# Patient Record
Sex: MALE | Race: OTHER | HISPANIC OR LATINO | URBAN - METROPOLITAN AREA
[De-identification: names, ages, dates, MRNs, and addresses within clinical notes are randomized per-mention and may not be internally consistent; named-entity substitution may affect disease eponyms.]

---

## 2024-02-15 ENCOUNTER — EMERGENCY (EMERGENCY)
Facility: HOSPITAL | Age: 39
LOS: 1 days | Discharge: HOME | End: 2024-02-15
Attending: EMERGENCY MEDICINE
Payer: SELF-PAY

## 2024-02-15 VITALS
OXYGEN SATURATION: 97 % | RESPIRATION RATE: 17 BRPM | TEMPERATURE: 98 F | DIASTOLIC BLOOD PRESSURE: 95 MMHG | HEART RATE: 107 BPM | WEIGHT: 172.4 LBS | SYSTOLIC BLOOD PRESSURE: 167 MMHG

## 2024-02-15 VITALS
TEMPERATURE: 98 F | SYSTOLIC BLOOD PRESSURE: 132 MMHG | RESPIRATION RATE: 18 BRPM | HEART RATE: 77 BPM | DIASTOLIC BLOOD PRESSURE: 74 MMHG | OXYGEN SATURATION: 97 %

## 2024-02-15 LAB
ALBUMIN SERPL ELPH-MCNC: 4.5 G/DL — SIGNIFICANT CHANGE UP (ref 3.3–5.2)
ALP SERPL-CCNC: 99 U/L — SIGNIFICANT CHANGE UP (ref 40–120)
ALT FLD-CCNC: 38 U/L — SIGNIFICANT CHANGE UP
ANION GAP SERPL CALC-SCNC: 15 MMOL/L — SIGNIFICANT CHANGE UP (ref 5–17)
AST SERPL-CCNC: 21 U/L — SIGNIFICANT CHANGE UP
BASOPHILS # BLD AUTO: 0.04 K/UL — SIGNIFICANT CHANGE UP (ref 0–0.2)
BASOPHILS NFR BLD AUTO: 0.5 % — SIGNIFICANT CHANGE UP (ref 0–2)
BILIRUB SERPL-MCNC: 0.5 MG/DL — SIGNIFICANT CHANGE UP (ref 0.4–2)
BUN SERPL-MCNC: 11.5 MG/DL — SIGNIFICANT CHANGE UP (ref 8–20)
CALCIUM SERPL-MCNC: 9.1 MG/DL — SIGNIFICANT CHANGE UP (ref 8.4–10.5)
CHLORIDE SERPL-SCNC: 100 MMOL/L — SIGNIFICANT CHANGE UP (ref 96–108)
CO2 SERPL-SCNC: 24 MMOL/L — SIGNIFICANT CHANGE UP (ref 22–29)
CREAT SERPL-MCNC: 0.76 MG/DL — SIGNIFICANT CHANGE UP (ref 0.5–1.3)
D DIMER BLD IA.RAPID-MCNC: <150 NG/ML DDU — SIGNIFICANT CHANGE UP
EGFR: 118 ML/MIN/1.73M2 — SIGNIFICANT CHANGE UP
EOSINOPHIL # BLD AUTO: 0.03 K/UL — SIGNIFICANT CHANGE UP (ref 0–0.5)
EOSINOPHIL NFR BLD AUTO: 0.4 % — SIGNIFICANT CHANGE UP (ref 0–6)
FLUAV AG NPH QL: SIGNIFICANT CHANGE UP
FLUBV AG NPH QL: SIGNIFICANT CHANGE UP
GLUCOSE SERPL-MCNC: 142 MG/DL — HIGH (ref 70–99)
HCT VFR BLD CALC: 49.5 % — SIGNIFICANT CHANGE UP (ref 39–50)
HGB BLD-MCNC: 17 G/DL — SIGNIFICANT CHANGE UP (ref 13–17)
IMM GRANULOCYTES NFR BLD AUTO: 0.1 % — SIGNIFICANT CHANGE UP (ref 0–0.9)
LYMPHOCYTES # BLD AUTO: 1.8 K/UL — SIGNIFICANT CHANGE UP (ref 1–3.3)
LYMPHOCYTES # BLD AUTO: 24.5 % — SIGNIFICANT CHANGE UP (ref 13–44)
MCHC RBC-ENTMCNC: 28.3 PG — SIGNIFICANT CHANGE UP (ref 27–34)
MCHC RBC-ENTMCNC: 34.3 GM/DL — SIGNIFICANT CHANGE UP (ref 32–36)
MCV RBC AUTO: 82.4 FL — SIGNIFICANT CHANGE UP (ref 80–100)
MONOCYTES # BLD AUTO: 0.52 K/UL — SIGNIFICANT CHANGE UP (ref 0–0.9)
MONOCYTES NFR BLD AUTO: 7.1 % — SIGNIFICANT CHANGE UP (ref 2–14)
NEUTROPHILS # BLD AUTO: 4.94 K/UL — SIGNIFICANT CHANGE UP (ref 1.8–7.4)
NEUTROPHILS NFR BLD AUTO: 67.4 % — SIGNIFICANT CHANGE UP (ref 43–77)
PLATELET # BLD AUTO: 295 K/UL — SIGNIFICANT CHANGE UP (ref 150–400)
POTASSIUM SERPL-MCNC: 3.8 MMOL/L — SIGNIFICANT CHANGE UP (ref 3.5–5.3)
POTASSIUM SERPL-SCNC: 3.8 MMOL/L — SIGNIFICANT CHANGE UP (ref 3.5–5.3)
PROT SERPL-MCNC: 7.5 G/DL — SIGNIFICANT CHANGE UP (ref 6.6–8.7)
RBC # BLD: 6.01 M/UL — HIGH (ref 4.2–5.8)
RBC # FLD: 12.7 % — SIGNIFICANT CHANGE UP (ref 10.3–14.5)
RSV RNA NPH QL NAA+NON-PROBE: SIGNIFICANT CHANGE UP
SARS-COV-2 RNA SPEC QL NAA+PROBE: SIGNIFICANT CHANGE UP
SODIUM SERPL-SCNC: 139 MMOL/L — SIGNIFICANT CHANGE UP (ref 135–145)
TROPONIN T, HIGH SENSITIVITY RESULT: <6 NG/L — SIGNIFICANT CHANGE UP (ref 0–51)
WBC # BLD: 7.34 K/UL — SIGNIFICANT CHANGE UP (ref 3.8–10.5)
WBC # FLD AUTO: 7.34 K/UL — SIGNIFICANT CHANGE UP (ref 3.8–10.5)

## 2024-02-15 PROCEDURE — 93005 ELECTROCARDIOGRAM TRACING: CPT

## 2024-02-15 PROCEDURE — 85025 COMPLETE CBC W/AUTO DIFF WBC: CPT

## 2024-02-15 PROCEDURE — 85379 FIBRIN DEGRADATION QUANT: CPT

## 2024-02-15 PROCEDURE — 93010 ELECTROCARDIOGRAM REPORT: CPT

## 2024-02-15 PROCEDURE — 87637 SARSCOV2&INF A&B&RSV AMP PRB: CPT

## 2024-02-15 PROCEDURE — 71045 X-RAY EXAM CHEST 1 VIEW: CPT

## 2024-02-15 PROCEDURE — 84484 ASSAY OF TROPONIN QUANT: CPT

## 2024-02-15 PROCEDURE — 36415 COLL VENOUS BLD VENIPUNCTURE: CPT

## 2024-02-15 PROCEDURE — 99285 EMERGENCY DEPT VISIT HI MDM: CPT

## 2024-02-15 PROCEDURE — 96374 THER/PROPH/DIAG INJ IV PUSH: CPT

## 2024-02-15 PROCEDURE — 99285 EMERGENCY DEPT VISIT HI MDM: CPT | Mod: 25

## 2024-02-15 PROCEDURE — 71045 X-RAY EXAM CHEST 1 VIEW: CPT | Mod: 26

## 2024-02-15 PROCEDURE — 80053 COMPREHEN METABOLIC PANEL: CPT

## 2024-02-15 PROCEDURE — 99053 MED SERV 10PM-8AM 24 HR FAC: CPT

## 2024-02-15 RX ORDER — SODIUM CHLORIDE 9 MG/ML
1000 INJECTION, SOLUTION INTRAVENOUS ONCE
Refills: 0 | Status: COMPLETED | OUTPATIENT
Start: 2024-02-15 | End: 2024-02-15

## 2024-02-15 RX ORDER — KETOROLAC TROMETHAMINE 30 MG/ML
15 SYRINGE (ML) INJECTION ONCE
Refills: 0 | Status: DISCONTINUED | OUTPATIENT
Start: 2024-02-15 | End: 2024-02-15

## 2024-02-15 RX ADMIN — SODIUM CHLORIDE 1000 MILLILITER(S): 9 INJECTION, SOLUTION INTRAVENOUS at 04:57

## 2024-02-15 RX ADMIN — Medication 15 MILLIGRAM(S): at 04:59

## 2024-02-15 NOTE — ED PROVIDER NOTE - ATTENDING WITH...
Resident V-Y Flap Text: The defect edges were debeveled with a #15 scalpel blade.  Given the location of the defect, shape of the defect and the proximity to free margins a V-Y flap was deemed most appropriate.  Using a sterile surgical marker, an appropriate advancement flap was drawn incorporating the defect and placing the expected incisions within the relaxed skin tension lines where possible.    The area thus outlined was incised deep to adipose tissue with a #15 scalpel blade.  The skin margins were undermined to an appropriate distance in all directions utilizing iris scissors.

## 2024-02-15 NOTE — ED PROVIDER NOTE - ATTENDING CONTRIBUTION TO CARE
Giselle: I performed a face to face evaluation of this patient and performed a full history and physical examination on the patient.  I agree with the resident's history, physical examination, and plan of the patient unless otherwise noted. My brief assessment is as follows: hx htn, hld p/w 1 day of intermittent chest discomfort with some sob. no hx dvt/pe, immobilization, drugs/etoh. no cardiac hx in pt or family. no f/c, no other complaints. non toxic, well appaering, nad, ctab, pulse ~110, abd benign, neuro intact, no swelling b/l LE. I independently interpreted the pt's ekg sinus tach, and cxr without acute findings. labs, symtpom control, reassess.

## 2024-02-15 NOTE — ED PROVIDER NOTE - NSFOLLOWUPINSTRUCTIONS_ED_ALL_ED_FT
1) Follow up with the referred cardiologist in 7-10 days  2) Return to the ER for worsening or concerning symptoms  3)take medication as prescribed     Nonspecific Chest Pain, Adult  Chest pain is an uncomfortable, tight, or painful feeling in the chest. The pain can feel like a crushing, aching, or squeezing pressure. A person can feel a burning or tingling sensation. Chest pain can also be felt in your back, neck, jaw, shoulder, or arm. This pain can be worse when you move, sneeze, or take a deep breath.    Chest pain can be caused by a condition that is life-threatening. This must be treated right away. It can also be caused by something that is not life-threatening. If you have chest pain, it can be hard to know the difference, so it is important to get help right away to make sure that you do not have a serious condition.    Some life-threatening causes of chest pain include:  Heart attack.  A tear in the body's main blood vessel (aortic dissection).  Inflammation around your heart (pericarditis).  A problem in the lungs, such as a blood clot (pulmonary embolism) or a collapsed lung (pneumothorax).  Some non life-threatening causes of chest pain include:  Heartburn.  Anxiety or stress.  Damage to the bones, muscles, and cartilage that make up your chest wall.  Pneumonia or bronchitis.  Shingles infection (varicella-zoster virus).  Your chest pain may come and go. It may also be constant. Your health care provider will do tests and other studies to find the cause of your pain. Treatment will depend on the cause of your chest pain.    Follow these instructions at home:  Medicines    Take over-the-counter and prescription medicines only as told by your health care provider.  If you were prescribed an antibiotic medicine, take it as told by your health care provider. Do not stop taking the antibiotic even if you start to feel better.  Activity    Avoid any activities that cause chest pain.  Do not lift anything that is heavier than 10 lb (4.5 kg), or the limit that you are told, until your health care provider says that it is safe.  Rest as directed by your health care provider.  Return to your normal activities only as told by your health care provider. Ask your health care provider what activities are safe for you.  Lifestyle    A plate along with examples of foods in a healthy diet.  Silhouette of a person sitting on the floor doing yoga.  Do not use any products that contain nicotine or tobacco, such as cigarettes, e-cigarettes, and chewing tobacco. If you need help quitting, ask your health care provider.  Do not drink alcohol.  Make healthy lifestyle changes as recommended. These may include:  Getting regular exercise. Ask your health care provider to suggest some exercises that are safe for you.  Eating a heart-healthy diet. This includes plenty of fresh fruits and vegetables, whole grains, low-fat (lean) protein, and low-fat dairy products. A dietitian can help you find healthy eating options.  Maintaining a healthy weight.  Managing any other health conditions you may have, such as high blood pressure (hypertension) or diabetes.  Reducing stress, such as with yoga or relaxation techniques.  General instructions    Pay attention to any changes in your symptoms.  It is up to you to get the results of any tests that were done. Ask your health care provider, or the department that is doing the tests, when your results will be ready.  Keep all follow-up visits as told by your health care provider. This is important.  You may be asked to go for further testing if your chest pain does not go away.  Contact a health care provider if:  Your chest pain does not go away.  You feel depressed.  You have a fever.  You notice changes in your symptoms or develop new symptoms.  Get help right away if:  Your chest pain gets worse.  You have a cough that gets worse, or you cough up blood.  You have severe pain in your abdomen.  You faint.  You have sudden, unexplained chest discomfort.  You have sudden, unexplained discomfort in your arms, back, neck, or jaw.  You have shortness of breath at any time.  You suddenly start to sweat, or your skin gets clammy.  You feel nausea or you vomit.  You suddenly feel lightheaded or dizzy.  You have severe weakness, or unexplained weakness or fatigue.  Your heart begins to beat quickly, or it feels like it is skipping beats.  These symptoms may represent a serious problem that is an emergency. Do not wait to see if the symptoms will go away. Get medical help right away. Call your local emergency services (911 in the [place] Do not drive yourself to the hospital.    Summary  Chest pain can be caused by a condition that is serious and requires urgent treatment. It may also be caused by something that is not life-threatening.  Your health care provider may do lab tests and other studies to find the cause of your pain.  Follow your health care provider's instructions on taking medicines, making lifestyle changes, and getting emergency treatment if symptoms become worse.  Keep all follow-up visits as told by your health care provider. This includes visits for any further testing if your chest pain does not go away.  This information is not intended to replace advice given to you by your health care provider. Make sure you discuss any questions you have with your health care provider.

## 2024-02-15 NOTE — ED ADULT TRIAGE NOTE - CHIEF COMPLAINT QUOTE
pt c/o left sided CP onset at 4pm yesterday, on/off, no radiation.  Pt also states nausea and slight SOB while trying to sleep.  Denies dizziness/visual changes/numbness/tingling.  States he took asa 324mg at 11pm.  hx of HTN, not on meds. EKG in triage

## 2024-02-15 NOTE — ED PROVIDER NOTE - ADDITIONAL NOTES AND INSTRUCTIONS:
To whom it may concern,     This  patient was evaluated at [facility], and was deemed to require time off from work. They may return on the mentioned date below. If you have any questions or concerns, you may contact the emergency department at [facility].  Thank you.     Dr. William Wiedemann, DO

## 2024-02-15 NOTE — ED PROVIDER NOTE - PATIENT PORTAL LINK FT
You can access the FollowMyHealth Patient Portal offered by [facility] by registering at the following website: [email or web address]. By joining [employer]’s FollowMyHealth portal, you will also be able to view your health information using other applications (apps) compatible with our system.

## 2024-02-15 NOTE — ED PROVIDER NOTE - PHYSICAL EXAMINATION
Const: Pt is well appearing. Awake, alert and oriented to person, place, & time. In no acute distress.   HEENT: NC/AT. Moist mucous membranes.  Eyes: PERRLA. No scleral icterus. EOMI.  Neck:. Soft and supple. Full ROM without pain. No cervical midline tenderness.   Cardiac: Tachycardia and regular rhythm. +S1/S2. Peripheral pulses 2+ and symmetric. No LE edema.  Resp: Speaking in full sentences, breath sounds equal and clear bilaterally. No wheezes, rales or rhonchi.  Abd: Soft, non-tender, non-distended. Normal bowel sounds in all 4 quadrants. No guarding or rebound.  MSK: Spine midline and non-tender. No CVAT.  Skin: No rashes, abrasions or lacerations.  Neuro: Moves all extremities symmetrically. No motor or sensory deficits

## 2024-02-15 NOTE — ED PROVIDER NOTE - OBJECTIVE STATEMENT
38-year-old male patient with history of hypertension, hyperlipidemia, not currently on any medication presents the ED complaining of chest pain.  Patient states that yesterday around 4 PM he was at work when he began to experience left-sided chest pain, aching, nonradiating, without any aggravating or relieving factors.  Patient states that the pain lasted for about 4 hours before dissipating.  He states around 10 PM last night after laying down he began to experience the pain again.  Patient states the pain is associated with shortness of breath and difficulty breathing.  He has been dealing with mild cough and congestion over the past few days.  Denies any fever or chills.  No history of similar pain in the past.  No recent travel.  No history of PE or DVT.  no history of early sudden cardiac death in family.  No further complaints at this time

## 2024-02-15 NOTE — ED PROVIDER NOTE - CARE PROVIDERS DIRECT ADDRESSES
[email or web address]

## 2024-02-15 NOTE — ED PROVIDER NOTE - CARE PROVIDER_API CALL
Lanie Floerz  Cardiology  [address], [address]  [place] [address]  Phone: [phone]  Fax: [phone]  Follow Up Time: 7-10 Days

## 2024-02-15 NOTE — ED ADULT NURSE NOTE - OBJECTIVE STATEMENT
patient presenting with left sided chest pain since yesterday in the afternoon and intermittent sob especially lying down. patient denies nausea, vomiting, palpitations,  dizziness, or lightheadedness.

## 2024-02-15 NOTE — ED PROVIDER NOTE - CLINICAL SUMMARY MEDICAL DECISION MAKING FREE TEXT BOX
38-year-old male patient presents the ED complaining of chest pain.  Chest pain has since resolved after medicine, fluids.  EKG shows no signs of ischemia, troponin less than 6, D-dimer negative.  Patient vital signs stable in the ED throughout his stay.  Educated patient on chest pain, importance of establishing primary care/medicine for hypertension and hyperlipidemia.  Patient currently does not have a primary care doctor.  patient be discharged home with  referral for cardiology so that he may have further cardiac workup if pain continues and evaluate for any hypertension or hyperlipidemia.  Patient instructed to take Tylenol Motrin at home for any pain or flulike symptoms.  Patient to return to the ED for worsening chest pain, shortness of breath, difficulty breathing, abdominal pain, nausea, vomiting.  Patient agrees with plan of care

## 2024-02-22 PROBLEM — Z00.00 ENCOUNTER FOR PREVENTIVE HEALTH EXAMINATION: Status: ACTIVE | Noted: 2024-02-22

## 2024-03-06 ENCOUNTER — APPOINTMENT (OUTPATIENT)
Dept: CARDIOLOGY | Facility: CLINIC | Age: 39
End: 2024-03-06